# Patient Record
Sex: FEMALE | Race: BLACK OR AFRICAN AMERICAN | NOT HISPANIC OR LATINO | Employment: FULL TIME | URBAN - METROPOLITAN AREA
[De-identification: names, ages, dates, MRNs, and addresses within clinical notes are randomized per-mention and may not be internally consistent; named-entity substitution may affect disease eponyms.]

---

## 2017-08-21 ENCOUNTER — HISTORICAL (OUTPATIENT)
Dept: RADIOLOGY | Facility: HOSPITAL | Age: 37
End: 2017-08-21

## 2017-09-18 ENCOUNTER — HISTORICAL (OUTPATIENT)
Dept: RADIOLOGY | Facility: HOSPITAL | Age: 37
End: 2017-09-18

## 2017-09-20 ENCOUNTER — HISTORICAL (OUTPATIENT)
Dept: RADIOLOGY | Facility: HOSPITAL | Age: 37
End: 2017-09-20

## 2019-01-11 ENCOUNTER — HISTORICAL (OUTPATIENT)
Dept: ADMINISTRATIVE | Facility: HOSPITAL | Age: 39
End: 2019-01-11

## 2019-11-11 LAB — POC BETA-HCG (QUAL): NEGATIVE

## 2022-04-07 ENCOUNTER — HISTORICAL (OUTPATIENT)
Dept: ADMINISTRATIVE | Facility: HOSPITAL | Age: 42
End: 2022-04-07

## 2022-04-24 VITALS
SYSTOLIC BLOOD PRESSURE: 126 MMHG | WEIGHT: 226 LBS | BODY MASS INDEX: 41.59 KG/M2 | DIASTOLIC BLOOD PRESSURE: 80 MMHG | HEIGHT: 62 IN

## 2022-05-01 NOTE — HISTORICAL [facility] CERNER
This is a historical note converted from Wili. Formatting and pictures may have been removed.  Please reference Wili for original formatting and attached multimedia. Chief Complaint  Right shoulder pain  History of Present Illness  37?yo?female?non-smoker?presents to ortho clinic for?routine follow up?for?right?shoulder pain and?reporting symptom resolution since CSI 9/11/18 for right CTS. ? Patient points to ?posterior shoulder.?? Patient had injection 10/10/17 and 9/11/18?states improved pain in shoulder.  Onset:??July 2017 with improvement  Current pain level: 6/10 without pain medication .?Quality described as?throbbing, pinch.?? Taking OTC ibuprofen with some relief.  Modifying Factors: ?Worse with/after activity;Improved with rest;??No stiffness??  Previous treatment:Conservative treatment for at least 3 months with HEP/ medications, steroid injection, RX NSAIDs with some?improvement. Requesting CSI in shoulder today, this is 3rd CSI this year.  Previous injuries:?Denies  Associated Symptoms:?No Crepitus/Grinding; No ?Numbness/ tingling;??No swelling;?No skin changes;?no weakness;?No decrease in ROM  Activity:?Sedentary, full ADLs;?Pain interferes with function/daily activity (mild)  Family History:?Family history of arthritis  Review of Systems  Constitutional:No fever;No chills;No weight loss  CV:No swelling;No edema  GI:No urinary retention;No urinary incontinence  :No fecal incontinence  Skin:No rash;No wound  Neuro:No numbness/tingling;No weakness;No saddle anesthesia  MSK: As above  Psych:No depression;No anxiety  Heme/Lymph:No easy bruising;No easy bleeding;No lymphadenopathy  Immuno:No MRSA history  Physical Exam  Vitals & Measurements  HT:?158?cm? HT:?158?cm? WT:?99.5?kg? WT:?99.5?kg? BMI:?39.86?  MSK: RIGHT SHOULDER  General: No apparent distress;?obese  Inspection:?Normal posture, no guarding/self-imposed immobilization of arm;??No skin abnormalities;??Normal?alignment;??No  swelling;?No?erythema;?No?bruising;?No atrophy?/ deconditioning noted  Palpation:?Tenderness noted at supraspinatus?tendon ; ?Crepitus:?Negative?  ROM: mild pain?associated with upward/lateral movement?  ?????Abduction (180):?150  ?????Adduction (45):?45  ?????Flexion (135):?135  ?????Extension (45):?45  ?????Internal rotation: L spine  ?????External rotation (45): 45  Special Tests:  ?????Winging of Scapula: ?Negative?Bilateral  ?????AC Joint Tenderness:?Negative  ?????Apley scratch test:Positive?  ?????Supraspinatus?stress test:?Positive?  ?????Painful Arc Sign:Negative  ?????Jobes test (empty can test):?Negative?  ?????External rotation test:?Negative  ?????Neer test: ?Negative  ?   MSK: RIGHT HAND/ WRIST  Inspection:?No masses, no deformity, no skin discoloration,? no joint swelling, no contracture, no?presences of cysts/nodules.? No muscle atrophy to the thenar eminence or intrinsic muscles of the first web space.  Palpation:?Non-tender;??No swelling;No bony enlargement  ROM:?  ?????Open/closure of hand:?no abnormalities, movement is full and smooth  Strength:??  ????? strength: normal and equal  Special Tests:  ?????Trigger finger presence: ?Negative  ?????Phalen test: Negative  ?????Tinel test:?Negative?  ?????Geovanny carpal compression: Negative  ?????Finkelstein test:?Negative  ?   Neurovascular:?Intact; 2+?distal pulse, sensation intact to light touch  Neuro/Psych: Awake, Alert, Oriented; Normal mood and affect  Lymphatic: No LAD  Skin and Soft Tissue: No bruising, No ecchymosis; No rash; Skin intact  Assessment/Plan  1.?Rotator cuff syndrome of right shoulder?M75.101  1.? Discussed with patient diagnosis and treatment recommendations.? Handout given.  2.? Imaging:?Radiological studies reviewed,?and independently interpreted by me; Discussed with patient;  3.? Treatment plan: Conservative treatment to include? ?oral glucosamine 1500 mg/day; Topical capsaicin as needed; rest;?Hot or cold therapy;  Adequate vitamin C/D intake  4.? Procedure:?Discussed CSI vs VS injections as treatment options; since conservative measures did not improve symptoms patient consented for CSI today  5.? Activity:?Activity as tolerated; HEP to included aerobic conditioning with non-painful activity and ROM/STG exercises; activity modification as necessary; eliminate overhead reaching and/or forceful push/pull and use weighted pendulum stretching for 5 minutes after?heat application daily; rubber band provided for HEP provided at prior visit  6.? Therapy:Formal PT/OT ordered?  7.? Medication:?First line treatment with? ?daily acetaminophen 1000 mg three times daily ; Medication precautions given; OTC ibuprofen as needed for pain according to label instructions, continue other medications as RX per PCP  8.? RTC:?4 months  9.? Additional work-up:?None  Ordered:  Lidocaine inj., 5 mL, form: Injection, Intra-Articular, Once, first dose 01/11/19 9:46:00 CST, stop date 01/11/19 9:46:00 CST  triamcinolone, 40 mg, form: Injection, Intra-Articular, Once, first dose 01/11/19 9:46:00 CST, stop date 01/11/19 9:46:00 CST  asp/inj jnt/bursa, major 20610 PC, 01/11/19 9:46:00 CST, [facility], Routine, 01/11/19 9:46:00 CST  Clinic Follow up, *Est. 05/11/19 3:00:00 CDT, Order for future visit, Rotator cuff syndrome of right shoulder, [facility]  Office/Outpatient Visit Level 4 Established 13520 , 25, Rotator cuff syndrome of right shoulder, [facility], 01/11/19 9:46:00 CST  ?  2.?Carpal tunnel syndrome, right?G56.01  1.? Discussed with patient diagnosis and treatment recommendations.? Handout given.  2.? Imaging:?EMG studies reviewed and discussed with patient.  3.? Treatment plan: Conservative treatment to include ?oral glucosamine 1500 mg/day; Topical capsaicin as needed; Hot or cold therapy; Adequate vitamin C/D intake;  4.? Procedure:?none  5.? Activity:?Activity as tolerated; activity modifications as necessary; nocturnal?splinting of  right wrist; HEP 1- times per week  6.? Therapy:none  7.? Medication:?same as above  8.? RTC:?PRN  9.? Additional work-up:?None  ?  3.?Morbid obesity?E66.01  ?Patient educated that diet modifications with low impact exercises as tolerated for reduction in BMI would improve overall treatment and outcome.?  ?  Pain?R52  same as above  Ordered:  XR Shoulder Right Minimum 2 Views, Routine, 01/11/19 8:51:00 CST, Pain, None, Ambulatory, Rad Type, Pain, Not Scheduled, 01/11/19 8:51:00 CST  ?   Problem List/Past Medical History  Ongoing  Breast nodule  BV (bacterial vaginosis)  Gynecologic exam normal  Menorrhagia  Morbid obesity  Screen for STD (sexually transmitted disease)  Vaginal discharge  Visit for routine gyn exam  Historical  Menarche  Pregnant  Pregnant  Pregnant  Pregnant  Procedure/Surgical History  sweat gland removed Rt. axilla (2012)   Medications  diclofenac 1% topical gel, 1 fran, TOP, QID, PRN, 3 refills,? ?Not Taking, Completed Rx  GABAPENTIN CAP 100MG, 100 mg= 1 cap(s), Oral, TID,? ?Still taking, not as prescribed: Taking prn  KETOROLAC TAB 10MG,? ?Not Taking per Prescriber  Norco 7.5 mg-325 mg oral tablet, 1 tab(s), Oral, Once, PRN,? 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 Living situation: Home/Independent., 09/11/2017  Other   and Catholic, 09/11/2017  Sexual  Sexually active: Yes., 09/11/2017  Substance Abuse - Denies Substance Abuse, 03/10/2015  Never, 09/11/2017  Tobacco  Never (less than 100 in lifetime), N/A, 01/11/2019  Family History  Breast cancer: Grandmother.  Health Maintenance  Health Maintenance  ???Pending?(in the next year)  ??? ??OverDue  ??? ? ? ?Depression Screening due??10/10/18??and every 1??year(s)  ???  ??Due?  ??? ? ? ?ADL Screening due??01/11/19??and every 1??year(s)  ??? ? ? ?Alcohol Misuse Screening due??01/11/19??and every 1??year(s)  ??? ? ? ?Diabetes Screening due??01/11/19??and every?  ??? ? ? ?Tetanus Vaccine due??01/11/19??and every 10??year(s)  ??? ??Due In Future?  ??? ? ? ?Blood Pressure Screening not due until??10/31/19??and every 1??year(s)  ??? ? ? ?Body Mass Index Check not due until??10/31/19??and every 1??year(s)  ???Satisfied?(in the past 1 year)  ??? ??Satisfied?  ??? ? ? ?Blood Pressure Screening on??10/31/18.  ??? ? ? ?Body Mass Index Check on??01/11/19.??Satisfied by Kelly Boogie RN  ??? ? ? ?Cervical Cancer Screening on??11/01/18.??Satisfied by Jordana Zambrano  ??? ? ? ?Influenza Vaccine on??01/11/19.??Satisfied by Kelly Boogie RN  ??? ? ? ?Obesity Screening on??01/11/19.??Satisfied by Kelly Boogie RN  ?  ?  Diagnostic Results  XR right shoulder obtained 1/11/19; no acute findings; awaiting radiologist findings.

## 2022-05-01 NOTE — HISTORICAL [facility] CERNER
This is a historical note converted from Wili. Formatting and pictures may have been removed.  Please reference Wili for original formatting and attached multimedia. Procedure Name  Date/Time:1/11/2019 09:54:50  Procedure:??Right?SASD Injection  Indications:??Diagnostic and Therapeutic Indication - decrease pain, increase range of motion and improve quality of life  RISKS: Possible complications with injection include?bleeding, infection (0.01%), tendon rupture, steroid flare, fat pad or soft tissue atrophy, skin depigmentation, and vasovagal response. ?(Steroid flair treatment is rest, ice, NSAIDs and resolves in 24-36 hours.)  Consent:???Procedure, risks, benefits, & alternatives explained to patient, who voiced understanding and agreed to proceed with procedure. ?Consent signed and?scanned into the medical record. No absolute contraindications (cellulitis overlying joint, infection, lack of informed consent, allergy to injection mediation, moses protein or egg allergy for sodium hyaluronate, or history of steroid flare) or relative contraindications (brittle or out of control DM HgA1c > 10, coagulopathy INR > 3.5, previous joint replacement, or history of AVN).  Description:?Time out performed. The patient was prepped?using Chlorhexidine scrub after the appropriate?identification of anatomic landmarks.? Sterile needle used (size # 21 gauge, length 1.5 inch)?Topical anesthetic of ethyl chloride was used.? ?5 mL of 1% lidocaine plain with 40 mg of Kenalog injected.  Complications:?None?  EBL:??None  Post Procedure:?Patient reports improvement in pain and ROM. Patient alert, moving all extremities. ?Good peripheral pulses, no signs of vascular compromise, range of motion intact. ?Patient tolerated procedure well. ?Aftercare instructions were given to patient at time of discharge.??Relative rest for 3 days - avoiding excessive activity. ?Pendulum stretching exercises followed by stretching exercises  daily?starting on day 4.? Place ice on area for 15 minutes every 4 to 6 hours.??Tylenol 1000mg twice a day or ibuprofen 600 mg three times per day for next 3-4 days if not on medication already. ?Protect the area for the next 1-8 hours if anesthetic was used. ?Avoid excessive activity for next 3 to 4 weeks.?ER precautions for fever, severe joint pain, or allergic reaction or other new symptoms related to joint injection.

## 2022-09-15 ENCOUNTER — HISTORICAL (OUTPATIENT)
Dept: ADMINISTRATIVE | Facility: HOSPITAL | Age: 42
End: 2022-09-15

## 2023-06-29 ENCOUNTER — ANESTHESIA EVENT (OUTPATIENT)
Dept: SURGERY | Facility: HOSPITAL | Age: 43
End: 2023-06-29
Payer: COMMERCIAL

## 2023-07-03 ENCOUNTER — LAB VISIT (OUTPATIENT)
Dept: LAB | Facility: HOSPITAL | Age: 43
End: 2023-07-03
Attending: OBSTETRICS & GYNECOLOGY
Payer: COMMERCIAL

## 2023-07-03 DIAGNOSIS — D25.1 INTRAMURAL LEIOMYOMA OF UTERUS: ICD-10-CM

## 2023-07-03 DIAGNOSIS — N92.0 EXCESSIVE OR FREQUENT MENSTRUATION: Primary | ICD-10-CM

## 2023-07-03 LAB
ABORH RETYPE: NORMAL
ANION GAP SERPL CALC-SCNC: 5 MEQ/L
B-HCG FREE SERPL-ACNC: <2.42 MIU/ML
BUN SERPL-MCNC: 8.9 MG/DL (ref 7–18.7)
CALCIUM SERPL-MCNC: 8.7 MG/DL (ref 8.4–10.2)
CHLORIDE SERPL-SCNC: 109 MMOL/L (ref 98–107)
CO2 SERPL-SCNC: 24 MMOL/L (ref 22–29)
CREAT SERPL-MCNC: 0.65 MG/DL (ref 0.55–1.02)
CREAT/UREA NIT SERPL: 14
ERYTHROCYTE [DISTWIDTH] IN BLOOD BY AUTOMATED COUNT: 22.5 % (ref 11.5–17)
GFR SERPLBLD CREATININE-BSD FMLA CKD-EPI: >60 MLS/MIN/1.73/M2
GLUCOSE SERPL-MCNC: 109 MG/DL (ref 74–100)
GROUP & RH: NORMAL
HCT VFR BLD AUTO: 29.8 % (ref 37–47)
HGB BLD-MCNC: 9.1 G/DL (ref 12–16)
INDIRECT COOMBS GEL: NORMAL
MCH RBC QN AUTO: 19.1 PG (ref 27–31)
MCHC RBC AUTO-ENTMCNC: 30.5 G/DL (ref 33–36)
MCV RBC AUTO: 62.5 FL (ref 80–94)
NRBC BLD AUTO-RTO: 0 %
PLATELET # BLD AUTO: 413 X10(3)/MCL (ref 130–400)
PMV BLD AUTO: 9.8 FL (ref 7.4–10.4)
POTASSIUM SERPL-SCNC: 3.9 MMOL/L (ref 3.5–5.1)
RBC # BLD AUTO: 4.77 X10(6)/MCL (ref 4.2–5.4)
SODIUM SERPL-SCNC: 138 MMOL/L (ref 136–145)
SPECIMEN OUTDATE: NORMAL
WBC # SPEC AUTO: 6.1 X10(3)/MCL (ref 4.5–11.5)

## 2023-07-03 PROCEDURE — 36415 COLL VENOUS BLD VENIPUNCTURE: CPT

## 2023-07-03 PROCEDURE — 80048 BASIC METABOLIC PNL TOTAL CA: CPT

## 2023-07-03 PROCEDURE — 85027 COMPLETE CBC AUTOMATED: CPT

## 2023-07-03 PROCEDURE — 84702 CHORIONIC GONADOTROPIN TEST: CPT

## 2023-07-03 PROCEDURE — 86900 BLOOD TYPING SEROLOGIC ABO: CPT | Performed by: OBSTETRICS & GYNECOLOGY

## 2023-07-03 NOTE — H&P (VIEW-ONLY)
Chief Complaint:  uterine fibroid, excessive VB with menses, surgical counseling    History of Present Illness    Taty Dunham is a 42 y.o.      Pt with >10 year history of know uterine fundal fibroid and increasingly   heavy cycles and with blood clots.    At this point she is requesting definitive surgical treatment with TLH.  She declines any attempts of medical mgt.     Past Medical History:   Diagnosis Date    Breast nodule     Intramural uterine fibroid     Irregular menstruation     Menorrhagia     Migraine     Morbid obesity        Past Surgical History:   Procedure Laterality Date    AXILLARY HIDRADENITIS EXCISION Right 2012    BREAST BIOPSY Left 2017    TUBAL LIGATION  2006    WISDOM TOOTH EXTRACTION  1999       Family History   Problem Relation Age of Onset    Breast cancer Other        Social History     Tobacco Use   Smoking Status Former    Types: Cigarettes   Smokeless Tobacco Never       Social History     Substance and Sexual Activity   Sexual Activity Yes        OB History    Para Term  AB Living   4 4 4     4   SAB IAB Ectopic Multiple Live Births           4      # Outcome Date GA Lbr Osvaldo/2nd Weight Sex Delivery Anes PTL Lv   4 Term 06 40w0d    Vag-Spont   ALBERTO   3 Term 04 38w0d    Vag-Spont   ALBERTO   2 Term 98 38w0d    Vag-Spont   ALBERTO   1 Term 96 38w0d    Vag-Spont   ALBERTO       Review of Systems  GENERAL: Denies unintentional weight gain or weight loss. Feeling well overall.   SKIN: Denies rash or lesions.   HEENT: Denies headaches, or vision changes.   CARDIOVASCULAR: Denies palpitations or chest pain.   RESPIRATORY: Denies shortness of breath or dyspnea on exertion.  BREASTS: Denies pain, lumps, or nipple discharge.   ABDOMEN: Denies abdominal pain, constipation, diarrhea, nausea, vomiting, change in appetite.  URINARY: Denies frequency, dysuria, hematuria.  NEUROLOGIC: Denies syncope or weakness.   PSYCHIATRIC: Denies depression,  "anxiety or mood swings.     Objective:     BP (!) 146/85   Pulse 91   Ht 5' 2.01" (1.575 m)   Wt 102.4 kg (225 lb 12 oz)   LMP 06/08/2023   BMI 41.28 kg/m²     Body mass index is 41.28 kg/m².    APPEARANCE: Well nourished, well developed, in no acute distress.  PSYCH: Appropriate mood and affect.  SKIN: No acne or hirsutism    NODES: No inguinal, axillary, or supraclavicular lymph node enlargement  CHEST: Normal respiratory effort  CV: Normal cap refill.    ABDOMEN: Soft.  No tenderness or masses.    Uterus: approx 18w  size with large fundal fibroid c/w prior history mobile, non-tender.  EXTREMITIES: No edema           Assessment/ Plan:     1. Menorrhagia with regular cycle        2. Intramural leiomyoma of uterus            1. To OR for Robot-assisted TLH  this week.    2. After detailed review of surgery and potential risks, consent signed.      Counseling     With the patient I had a full discussion of the risks, benefits, and alternatives of all procedures to be performed, including but not limited to injury to other organs, failure to resolve problem, recurrence of disease state, and infection. We discussed the risks, benefits, and alternatives as well.  She agrees with the plan of care; therefore, we will proceed with the surgery as scheduled.     "

## 2023-07-06 ENCOUNTER — HOSPITAL ENCOUNTER (OUTPATIENT)
Facility: HOSPITAL | Age: 43
Discharge: HOME | End: 2023-07-06
Attending: OBSTETRICS & GYNECOLOGY | Admitting: OBSTETRICS & GYNECOLOGY
Payer: COMMERCIAL

## 2023-07-06 ENCOUNTER — ANESTHESIA (OUTPATIENT)
Dept: SURGERY | Facility: HOSPITAL | Age: 43
End: 2023-07-06
Payer: COMMERCIAL

## 2023-07-06 DIAGNOSIS — N92.0 EXCESSIVE OR FREQUENT MENSTRUATION: ICD-10-CM

## 2023-07-06 DIAGNOSIS — N92.0 MENORRHAGIA: ICD-10-CM

## 2023-07-06 DIAGNOSIS — D25.1 FIBROIDS, INTRAMURAL: ICD-10-CM

## 2023-07-06 DIAGNOSIS — N92.0 MENORRHAGIA WITH REGULAR CYCLE: Primary | ICD-10-CM

## 2023-07-06 LAB — B-HCG FREE SERPL-ACNC: <2.42 MIU/ML

## 2023-07-06 PROCEDURE — D9220A PRA ANESTHESIA: ICD-10-PCS | Mod: CRNA,,, | Performed by: STUDENT IN AN ORGANIZED HEALTH CARE EDUCATION/TRAINING PROGRAM

## 2023-07-06 PROCEDURE — 37000009 HC ANESTHESIA EA ADD 15 MINS: Performed by: OBSTETRICS & GYNECOLOGY

## 2023-07-06 PROCEDURE — 63600175 PHARM REV CODE 636 W HCPCS: Performed by: STUDENT IN AN ORGANIZED HEALTH CARE EDUCATION/TRAINING PROGRAM

## 2023-07-06 PROCEDURE — 36000713 HC OR TIME LEV V EA ADD 15 MIN: Performed by: OBSTETRICS & GYNECOLOGY

## 2023-07-06 PROCEDURE — D9220A PRA ANESTHESIA: ICD-10-PCS | Mod: ANES,,, | Performed by: STUDENT IN AN ORGANIZED HEALTH CARE EDUCATION/TRAINING PROGRAM

## 2023-07-06 PROCEDURE — 25000003 PHARM REV CODE 250: Performed by: OBSTETRICS & GYNECOLOGY

## 2023-07-06 PROCEDURE — 37000008 HC ANESTHESIA 1ST 15 MINUTES: Performed by: OBSTETRICS & GYNECOLOGY

## 2023-07-06 PROCEDURE — D9220A PRA ANESTHESIA: Mod: ANES,,, | Performed by: STUDENT IN AN ORGANIZED HEALTH CARE EDUCATION/TRAINING PROGRAM

## 2023-07-06 PROCEDURE — 84702 CHORIONIC GONADOTROPIN TEST: CPT | Performed by: OBSTETRICS & GYNECOLOGY

## 2023-07-06 PROCEDURE — 63600175 PHARM REV CODE 636 W HCPCS: Performed by: OBSTETRICS & GYNECOLOGY

## 2023-07-06 PROCEDURE — 71000016 HC POSTOP RECOV ADDL HR: Performed by: OBSTETRICS & GYNECOLOGY

## 2023-07-06 PROCEDURE — 71000015 HC POSTOP RECOV 1ST HR: Performed by: OBSTETRICS & GYNECOLOGY

## 2023-07-06 PROCEDURE — 25000003 PHARM REV CODE 250: Performed by: STUDENT IN AN ORGANIZED HEALTH CARE EDUCATION/TRAINING PROGRAM

## 2023-07-06 PROCEDURE — D9220A PRA ANESTHESIA: Mod: CRNA,,, | Performed by: STUDENT IN AN ORGANIZED HEALTH CARE EDUCATION/TRAINING PROGRAM

## 2023-07-06 PROCEDURE — 36000712 HC OR TIME LEV V 1ST 15 MIN: Performed by: OBSTETRICS & GYNECOLOGY

## 2023-07-06 PROCEDURE — 27201423 OPTIME MED/SURG SUP & DEVICES STERILE SUPPLY: Performed by: OBSTETRICS & GYNECOLOGY

## 2023-07-06 PROCEDURE — 71000033 HC RECOVERY, INTIAL HOUR: Performed by: OBSTETRICS & GYNECOLOGY

## 2023-07-06 RX ORDER — FENTANYL CITRATE 50 UG/ML
INJECTION, SOLUTION INTRAMUSCULAR; INTRAVENOUS
Status: DISCONTINUED | OUTPATIENT
Start: 2023-07-06 | End: 2023-07-06

## 2023-07-06 RX ORDER — HYDROMORPHONE HYDROCHLORIDE 2 MG/ML
1 INJECTION, SOLUTION INTRAMUSCULAR; INTRAVENOUS; SUBCUTANEOUS EVERY 4 HOURS PRN
Status: DISCONTINUED | OUTPATIENT
Start: 2023-07-06 | End: 2023-07-06 | Stop reason: HOSPADM

## 2023-07-06 RX ORDER — DEXAMETHASONE SODIUM PHOSPHATE 4 MG/ML
INJECTION, SOLUTION INTRA-ARTICULAR; INTRALESIONAL; INTRAMUSCULAR; INTRAVENOUS; SOFT TISSUE
Status: DISCONTINUED | OUTPATIENT
Start: 2023-07-06 | End: 2023-07-06

## 2023-07-06 RX ORDER — METOCLOPRAMIDE HYDROCHLORIDE 5 MG/ML
10 INJECTION INTRAMUSCULAR; INTRAVENOUS EVERY 10 MIN PRN
Status: DISCONTINUED | OUTPATIENT
Start: 2023-07-06 | End: 2023-07-06 | Stop reason: HOSPADM

## 2023-07-06 RX ORDER — PROPOFOL 10 MG/ML
VIAL (ML) INTRAVENOUS
Status: DISCONTINUED | OUTPATIENT
Start: 2023-07-06 | End: 2023-07-06

## 2023-07-06 RX ORDER — ACETAMINOPHEN 10 MG/ML
INJECTION, SOLUTION INTRAVENOUS
Status: DISCONTINUED | OUTPATIENT
Start: 2023-07-06 | End: 2023-07-06

## 2023-07-06 RX ORDER — CEFAZOLIN SODIUM 2 G/50ML
2 SOLUTION INTRAVENOUS
Status: DISCONTINUED | OUTPATIENT
Start: 2023-07-06 | End: 2023-07-06 | Stop reason: HOSPADM

## 2023-07-06 RX ORDER — HYDROMORPHONE HYDROCHLORIDE 2 MG/ML
0.4 INJECTION, SOLUTION INTRAMUSCULAR; INTRAVENOUS; SUBCUTANEOUS EVERY 10 MIN PRN
Status: DISCONTINUED | OUTPATIENT
Start: 2023-07-06 | End: 2023-07-06

## 2023-07-06 RX ORDER — OXYCODONE AND ACETAMINOPHEN 5; 325 MG/1; MG/1
1 TABLET ORAL EVERY 4 HOURS PRN
Qty: 28 TABLET | Refills: 0 | Status: SHIPPED | OUTPATIENT
Start: 2023-07-06

## 2023-07-06 RX ORDER — BUPIVACAINE HYDROCHLORIDE 2.5 MG/ML
INJECTION, SOLUTION EPIDURAL; INFILTRATION; INTRACAUDAL
Status: DISCONTINUED | OUTPATIENT
Start: 2023-07-06 | End: 2023-07-06 | Stop reason: HOSPADM

## 2023-07-06 RX ORDER — ONDANSETRON 2 MG/ML
INJECTION INTRAMUSCULAR; INTRAVENOUS
Status: DISCONTINUED | OUTPATIENT
Start: 2023-07-06 | End: 2023-07-06

## 2023-07-06 RX ORDER — MIDAZOLAM HYDROCHLORIDE 1 MG/ML
INJECTION INTRAMUSCULAR; INTRAVENOUS
Status: DISCONTINUED | OUTPATIENT
Start: 2023-07-06 | End: 2023-07-06

## 2023-07-06 RX ORDER — ROCURONIUM BROMIDE 10 MG/ML
INJECTION, SOLUTION INTRAVENOUS
Status: DISCONTINUED | OUTPATIENT
Start: 2023-07-06 | End: 2023-07-06

## 2023-07-06 RX ORDER — SODIUM CHLORIDE 9 MG/ML
INJECTION, SOLUTION INTRAVENOUS CONTINUOUS
Status: DISCONTINUED | OUTPATIENT
Start: 2023-07-06 | End: 2023-07-06 | Stop reason: HOSPADM

## 2023-07-06 RX ORDER — DIPHENHYDRAMINE HYDROCHLORIDE 50 MG/ML
25 INJECTION INTRAMUSCULAR; INTRAVENOUS EVERY 6 HOURS PRN
Status: DISCONTINUED | OUTPATIENT
Start: 2023-07-06 | End: 2023-07-06 | Stop reason: HOSPADM

## 2023-07-06 RX ORDER — ONDANSETRON 4 MG/1
8 TABLET, ORALLY DISINTEGRATING ORAL EVERY 8 HOURS PRN
Status: DISCONTINUED | OUTPATIENT
Start: 2023-07-06 | End: 2023-07-06 | Stop reason: HOSPADM

## 2023-07-06 RX ORDER — MEPERIDINE HYDROCHLORIDE 25 MG/ML
12.5 INJECTION INTRAMUSCULAR; INTRAVENOUS; SUBCUTANEOUS EVERY 10 MIN PRN
Status: DISCONTINUED | OUTPATIENT
Start: 2023-07-06 | End: 2023-07-06 | Stop reason: HOSPADM

## 2023-07-06 RX ORDER — IBUPROFEN 600 MG/1
600 TABLET ORAL EVERY 6 HOURS PRN
Status: DISCONTINUED | OUTPATIENT
Start: 2023-07-06 | End: 2023-07-06 | Stop reason: HOSPADM

## 2023-07-06 RX ORDER — HYDROMORPHONE HYDROCHLORIDE 2 MG/ML
0.2 INJECTION, SOLUTION INTRAMUSCULAR; INTRAVENOUS; SUBCUTANEOUS EVERY 5 MIN PRN
Status: DISCONTINUED | OUTPATIENT
Start: 2023-07-06 | End: 2023-07-06 | Stop reason: HOSPADM

## 2023-07-06 RX ORDER — KETOROLAC TROMETHAMINE 30 MG/ML
INJECTION, SOLUTION INTRAMUSCULAR; INTRAVENOUS
Status: DISCONTINUED | OUTPATIENT
Start: 2023-07-06 | End: 2023-07-06

## 2023-07-06 RX ORDER — EPINEPHRINE 0.1 MG/ML
INJECTION INTRAVENOUS
Status: DISCONTINUED | OUTPATIENT
Start: 2023-07-06 | End: 2023-07-06 | Stop reason: HOSPADM

## 2023-07-06 RX ORDER — FAMOTIDINE 20 MG/1
20 TABLET, FILM COATED ORAL
Status: COMPLETED | OUTPATIENT
Start: 2023-07-06 | End: 2023-07-06

## 2023-07-06 RX ORDER — SODIUM CHLORIDE 0.9 % (FLUSH) 0.9 %
10 SYRINGE (ML) INJECTION
Status: DISCONTINUED | OUTPATIENT
Start: 2023-07-06 | End: 2023-07-06 | Stop reason: HOSPADM

## 2023-07-06 RX ORDER — HYDROCODONE BITARTRATE AND ACETAMINOPHEN 5; 325 MG/1; MG/1
1 TABLET ORAL EVERY 4 HOURS PRN
Status: DISCONTINUED | OUTPATIENT
Start: 2023-07-06 | End: 2023-07-06 | Stop reason: HOSPADM

## 2023-07-06 RX ORDER — SCOLOPAMINE TRANSDERMAL SYSTEM 1 MG/1
1 PATCH, EXTENDED RELEASE TRANSDERMAL
Status: DISCONTINUED | OUTPATIENT
Start: 2023-07-06 | End: 2023-07-06 | Stop reason: HOSPADM

## 2023-07-06 RX ORDER — PROCHLORPERAZINE EDISYLATE 5 MG/ML
5 INJECTION INTRAMUSCULAR; INTRAVENOUS EVERY 30 MIN PRN
Status: DISCONTINUED | OUTPATIENT
Start: 2023-07-06 | End: 2023-07-06 | Stop reason: HOSPADM

## 2023-07-06 RX ORDER — PROCHLORPERAZINE EDISYLATE 5 MG/ML
5 INJECTION INTRAMUSCULAR; INTRAVENOUS EVERY 6 HOURS PRN
Status: DISCONTINUED | OUTPATIENT
Start: 2023-07-06 | End: 2023-07-06 | Stop reason: HOSPADM

## 2023-07-06 RX ORDER — LIDOCAINE HYDROCHLORIDE 20 MG/ML
INJECTION INTRAVENOUS
Status: DISCONTINUED | OUTPATIENT
Start: 2023-07-06 | End: 2023-07-06

## 2023-07-06 RX ADMIN — PROPOFOL 200 MG: 10 INJECTION, EMULSION INTRAVENOUS at 07:07

## 2023-07-06 RX ADMIN — ROCURONIUM BROMIDE 20 MG: 10 SOLUTION INTRAVENOUS at 08:07

## 2023-07-06 RX ADMIN — SCOPOLAMINE 1 PATCH: 1 PATCH TRANSDERMAL at 06:07

## 2023-07-06 RX ADMIN — LIDOCAINE HYDROCHLORIDE 100 MG: 20 INJECTION INTRAVENOUS at 07:07

## 2023-07-06 RX ADMIN — HYDROCODONE BITARTRATE AND ACETAMINOPHEN 1 TABLET: 5; 325 TABLET ORAL at 10:07

## 2023-07-06 RX ADMIN — ROCURONIUM BROMIDE 50 MG: 10 SOLUTION INTRAVENOUS at 07:07

## 2023-07-06 RX ADMIN — HYDROMORPHONE HYDROCHLORIDE 0.2 MG: 2 INJECTION INTRAMUSCULAR; INTRAVENOUS; SUBCUTANEOUS at 09:07

## 2023-07-06 RX ADMIN — DEXAMETHASONE SODIUM PHOSPHATE 8 MG: 4 INJECTION, SOLUTION INTRA-ARTICULAR; INTRALESIONAL; INTRAMUSCULAR; INTRAVENOUS; SOFT TISSUE at 07:07

## 2023-07-06 RX ADMIN — DEXTROSE MONOHYDRATE 2 G: 50 INJECTION, SOLUTION INTRAVENOUS at 07:07

## 2023-07-06 RX ADMIN — SODIUM CHLORIDE, SODIUM GLUCONATE, SODIUM ACETATE, POTASSIUM CHLORIDE AND MAGNESIUM CHLORIDE: 526; 502; 368; 37; 30 INJECTION, SOLUTION INTRAVENOUS at 07:07

## 2023-07-06 RX ADMIN — KETOROLAC TROMETHAMINE 30 MG: 30 INJECTION, SOLUTION INTRAMUSCULAR; INTRAVENOUS at 09:07

## 2023-07-06 RX ADMIN — IBUPROFEN 600 MG: 600 TABLET, FILM COATED ORAL at 10:07

## 2023-07-06 RX ADMIN — ONDANSETRON 4 MG: 2 INJECTION INTRAMUSCULAR; INTRAVENOUS at 09:07

## 2023-07-06 RX ADMIN — ACETAMINOPHEN 1000 MG: 10 INJECTION, SOLUTION INTRAVENOUS at 08:07

## 2023-07-06 RX ADMIN — MIDAZOLAM HYDROCHLORIDE 2 MG: 1 INJECTION, SOLUTION INTRAMUSCULAR; INTRAVENOUS at 07:07

## 2023-07-06 RX ADMIN — FENTANYL CITRATE 100 MCG: 50 INJECTION, SOLUTION INTRAMUSCULAR; INTRAVENOUS at 07:07

## 2023-07-06 RX ADMIN — SUGAMMADEX 200 MG: 100 INJECTION, SOLUTION INTRAVENOUS at 09:07

## 2023-07-06 RX ADMIN — ROCURONIUM BROMIDE 20 MG: 10 SOLUTION INTRAVENOUS at 07:07

## 2023-07-06 RX ADMIN — FAMOTIDINE 20 MG: 20 TABLET, FILM COATED ORAL at 06:07

## 2023-07-06 NOTE — ANESTHESIA PREPROCEDURE EVALUATION
07/06/2023  Taty Gibson is a 42 y.o., female.    Other Medical History   Menorrhagia Intramural uterine fibroid   Morbid obesity Breast nodule   Migraine Irregular menstruation     Surgical History  BREAST BIOPSY AXILLARY HIDRADENITIS EXCISION   TUBAL LIGATION WISDOM TOOTH EXTRACTION     Asa 3 for morbid obesity with bmi > 40  Na 138, K 3.9, Cr 0.65  9.1 / 30 / 413k // type and screen active    Pre-op Assessment    I have reviewed the Patient Summary Reports.     I have reviewed the Nursing Notes. I have reviewed the NPO Status.   I have reviewed the Medications.     Review of Systems  Anesthesia Hx:   Denies Personal Hx of Anesthesia complications.   Social:  Former Smoker    Cardiovascular:   Exercise tolerance: good    Pulmonary:  Pulmonary Normal    Hepatic/GI:  Hepatic/GI Normal    Neurological:   Headaches (migraine)    Endocrine:  Morbid Obesity / BMI > 40  Psych:  Psychiatric Normal           Physical Exam  General: Well nourished, Cooperative and Alert    Airway:  Mallampati: I   Mouth Opening: Normal  TM Distance: Normal  Tongue: Large  Neck ROM: Normal ROM    Dental:  Intact    Chest/Lungs:  Normal Respiratory Rate    Heart:  Rate: Normal        Anesthesia Plan  Type of Anesthesia, risks & benefits discussed:    Anesthesia Type: Gen ETT  Intra-op Monitoring Plan: Standard ASA Monitors  Post Op Pain Control Plan: multimodal analgesia  Induction:  IV  Informed Consent: Informed consent signed with the Patient and all parties understand the risks and agree with anesthesia plan.  All questions answered.   ASA Score: 3  Day of Surgery Review of History & Physical: H&P Update referred to the surgeon/provider.    Ready For Surgery From Anesthesia Perspective.     .

## 2023-07-06 NOTE — ANESTHESIA PROCEDURE NOTES
Intubation    Date/Time: 7/6/2023 7:26 AM  Performed by: Abimbola Schmidt CRNA  Authorized by: Kendell Araujo MD     Intubation:     Induction:  Intravenous    Intubated:  Postinduction    Mask Ventilation:  Easy mask    Attempts:  1    Attempted By:  Student and CRNA    Method of Intubation:  Direct    Blade:  Carmita 3    Laryngeal View Grade: Grade IIb - only the arytenoids and epiglottis seen      Difficult Airway Encountered?: No      Complications:  None    Airway Device:  Oral endotracheal tube    Airway Device Size:  7.0    Style/Cuff Inflation:  Cuffed    Inflation Amount (mL):  6    Tube secured:  19    Secured at:  The lips    Placement Verified By:  Capnometry    Complicating Factors:  None    Findings Post-Intubation:  BS equal bilateral and atraumatic/condition of teeth unchanged

## 2023-07-06 NOTE — OP NOTE
[facility] - Periop Services  OBGYN  Operative Note    SUMMARY     Date of Procedure: 7/6/2023     Procedure: Procedure(s) (LRB):  XI ROBOTIC HYSTERECTOMY (N/A)       Surgeon: Tim Lerma M.D.    Assistant:     Assisting Surgeon: None    Pre-Operative Diagnosis: Fibroids, intramural [D25.1]  Excessive or frequent menstruation [N92.0]    Post-Operative Diagnosis: same as above    Anesthesia: General    Complications: No    Estimated Blood Loss (EBL):  <100ml    Findings:     1. Approx 15week size uterus   2. bilateral tube s/p BTL and normal ovaries, though right ovary with adhesions to underlying ovarian fossa        Procedure in Detail:  Ms. Gibson was taken to the operating room, and a time out was performed. General anesthesia was performed. The patient was then prepped and draped in the normal sterile fashion. She was placed in the dorsal lithotomy position in Alejandro stirrups. Her arms were tucked in the usual fashion. A ledbetter was placed to gravity.The cervix was grasped with a single tooth tenaculum and th uterus was sounded to 10 cm. The 10 cm DANE tip and medium Koh cup were then assembled and placed in and around the cervix.       Attention was then turned towards the abdomen where the base of the umbilicus was anesthetized with half percent Marcaine with epinephrine.   A 1 cm vertical skin incision was made with scalpel at the base of the umbilicus. Veress needle was placed in the intraperitoneal cavity. Intraperitoneal placement confirmed by instillation of normal saline and ball test.  The pneumoperitoneum was then obtained using CO2 with opening pressure noted to be less than 10 mmHg. Pressure was set at 15 mm of mercury.  An 8.5mm camera trochar was placed through the umbilical incision and peritoneal placement was immediately verified laparoscopically.    2 accessory trocars are placed in the right lower quadrant through an anesthetized 8 mm skin incisions under direct laparoscopic  visualization.  The lateral-most trochar was an 8mm  AirSeal trochar for insuflation.  An additional accessory trochar was placed in the left lower quadrant in similar fashion.    Patient's placed in steep Trendelenburg position after which the bowel was swept out of the posterior cul-de-sac. Next the Trendelenburg is reduced approximately 20° for the remainder of the case.   At this time the robot was side-docked with the 8.5mm camera placed through the umbilical trocar.  The first right lower quadrant trocar was initially assembled with the vessel sealer instrument. The left sided trochar was assembled with the [place] bipolar device.  The surgeon broke scrub and was then seated at the console. Diagnostic laparoscopy revealed findings as described above.      The hysterectomy was then initiated in the right side where the tube was mobilized and transected from the mesenteric attachments using the vessel sealer device. Next the right round ligament was cauterized  and transected using the vessel sealer. The right utero-ovarian ligament was cauterized and then transected.  The right broad ligament was opened anteriorly and posteriorly with the vessel sealer. The bladder flap was then created anteriorly using the monopolar scissors with a small amount of cautery. The right uterine artery and veins were skeletonized, cauterized, and then transected.   Procedures were repeated on the left side where the left tube was mobilized while preserving the left ovary, followed by transection of the left round ligament and the left  utero-ovarian ligament. In similar fashion the left uterine vessels were skeletonized cauterized and transected.  The bladder adhesions were carefully dissected of the lower uterine segment  scare with the hot anthony.    Colpotomy was then made anteriorly against the Koh ring using monopolar scissors. The incision was carried around circumferentially until the uterus and cervix were  completely amputated from the vaginal attachments. The uterus was then withdrawn through the vaginal cavity. Vaginal cuff was then closed with 2-0 Stratafix in a running fashion.  After hemostasis was assured, the robot was then undocked.    Next Hemoblast was applied laparoscopically. At this time the pelvis was again irrigated and approximately 700 mL of warm lactated Ringer's was left in the pelvic cavity at the close of the case both minimize postoperative pain as well as postoperative adhesions.   Pneumoperitoneum is then evacuated and all remaining trocars were removed from the abdomen. All 4 trocar sites were closed with 4-0 Monocryl in subcuticular fashion and then sealed with Dermabond skin sealant.     The patient tolerated the procedure well and was taken to the PACU awake and in stable condition.    Specimens:    1. Uterus, cervix, bilateral tubes           Disposition: stable, to home after meeting discharge criteria today.

## 2023-07-06 NOTE — TRANSFER OF CARE
"Anesthesia Transfer of Care Note    Patient: Taty Gibson    Procedure(s) Performed: Procedure(s) (LRB):  XI ROBOTIC HYSTERECTOMY (N/A)    Patient location: PACU    Anesthesia Type: general    Transport from OR: Transported from OR on room air with adequate spontaneous ventilation    Post pain: adequate analgesia    Post assessment: no apparent anesthetic complications    Post vital signs: stable    Level of consciousness: sedated    Nausea/Vomiting: no nausea/vomiting    Complications: none    Transfer of care protocol was followed      Last vitals:   Visit Vitals  /72   Pulse 74   Temp 36.4 °C (97.5 °F) (Temporal)   Resp 11   Ht 5' 2" (1.575 m)   Wt 98.9 kg (218 lb)   LMP 06/08/2023   SpO2 100%   Breastfeeding No   BMI 39.87 kg/m²     "

## 2023-07-06 NOTE — DISCHARGE INSTRUCTIONS
-NO driving for 24 hours and while taking narcotic pain medication.    -Keep sites clean and dry for 48 hours. OK to shower afterwards. Do not tub bathe or submerge incision under water.    -Pelvic rest: no heavy lifting, no tampons, no intercourse for 8 weeks or until cleared by MD.    -You did receive suggammadex, please see attached document concerning birthcontrol and suggammadex.    -Monitor sites for infection: redness, swelling, fever, chills, drainage/pus/foul odor.    -Minimal bleeding is expected. Notify your provider if soaking through pad < 1hour.    -Report to your nearest ER/Notify provider if you experience any SUDDEN/SEVERE chest pain, abdominal pain, weakness, trouble breathing, uncontrolled pain/bleeding.

## 2023-07-07 ENCOUNTER — PATIENT MESSAGE (OUTPATIENT)
Dept: ADMINISTRATIVE | Facility: OTHER | Age: 43
End: 2023-07-07
Payer: MEDICAID

## 2023-07-07 VITALS
OXYGEN SATURATION: 99 % | SYSTOLIC BLOOD PRESSURE: 135 MMHG | DIASTOLIC BLOOD PRESSURE: 83 MMHG | RESPIRATION RATE: 20 BRPM | WEIGHT: 218 LBS | HEART RATE: 62 BPM | TEMPERATURE: 98 F | BODY MASS INDEX: 40.12 KG/M2 | HEIGHT: 62 IN

## 2023-07-07 NOTE — ANESTHESIA POSTPROCEDURE EVALUATION
Anesthesia Post Evaluation    Patient: Taty Gibson    Procedure(s) Performed: Procedure(s) (LRB):  XI ROBOTIC HYSTERECTOMY (N/A)    Final Anesthesia Type: general      Patient location during evaluation: PACU  Patient participation: Yes- Able to Participate  Level of consciousness: awake and alert  Post-procedure vital signs: reviewed and stable  Pain management: adequate  Airway patency: patent    PONV status at discharge: No PONV  Anesthetic complications: no      Respiratory status: unassisted  Hydration status: euvolemic  Follow-up not needed.          Vitals Value Taken Time   /83 07/06/23 1120   Temp 36.6 °C (97.8 °F) 07/06/23 1007   Pulse 62 07/06/23 1120   Resp 20 07/06/23 1017   SpO2 99 % 07/06/23 1120         Event Time   Out of Recovery 10:00:00         Pain/Toby Score: Pain Rating Prior to Med Admin: 10 (7/6/2023 10:17 AM)  Toby Score: 10 (7/6/2023  2:00 PM)  Modified Toby Score: 20 (7/6/2023  2:00 PM)

## 2023-07-11 LAB — PSYCHE PATHOLOGY RESULT: NORMAL

## 2023-07-17 NOTE — DISCHARGE SUMMARY
[facility]  Discharge Summary  OBGYN    Admission date: 7/6/2023  Discharge date: 7/6/2023    Admit Dx:    There is no problem list on file for this patient.    Discharge Dx:  There is no problem list on file for this patient.      Attending Physician: Tim Lerma   Discharge Provider: Tim Lerma    Procedures Performed: Procedure(s) (LRB):  XI ROBOTIC HYSTERECTOMY (N/A)    Hospital Course: Patient was admitted on 7/6/2023 .  Procedure was uncomplicated, for full details see operative report. Barring any unforseen incidents, patient will be discharged home when she is able to ambulate, void, and tolerate PO intake.    Consults: none      Discharged Condition: stable    Disposition: Home    Follow Up:   2 weeks in office.

## (undated) DEVICE — PORT ACCESS 8MM W/120MM LOW

## (undated) DEVICE — TRAY CATH FOL SIL URIMTR 16FR

## (undated) DEVICE — PAD SANITARY HVY ABSRB UNSCNTD

## (undated) DEVICE — BELLOW CANN HEMOBLAST 1.65GR

## (undated) DEVICE — SET TRI-LUMEN FILTERED TUBE

## (undated) DEVICE — SEAL UNIVERSAL 5MM-8MM XI

## (undated) DEVICE — Device

## (undated) DEVICE — KIT SURGICAL TURNOVER

## (undated) DEVICE — ELECTRODE PATIENT RETURN DISP

## (undated) DEVICE — SUT MCRYL PLUS 4-0 PS2 27IN

## (undated) DEVICE — PAD PINK TRENDELENBURG POS XL

## (undated) DEVICE — SPONGE LAP STRL 18X18IN

## (undated) DEVICE — ADHESIVE DERMABOND ADVANCED

## (undated) DEVICE — APPLICATOR HEMOBLAST LAPSCP

## (undated) DEVICE — COVER MAYO STAND REINFRCD 30

## (undated) DEVICE — SEALER VESSEL EXTEND

## (undated) DEVICE — SOL LR IRR 1000ML PB

## (undated) DEVICE — GLOVE PROTEXIS BLUE LATEX 8

## (undated) DEVICE — LEGGING SURG CONV 43X28IN

## (undated) DEVICE — SYR 10CC LUER LOCK

## (undated) DEVICE — NDL HYPO REG 25G X 1 1/2

## (undated) DEVICE — MARKER WRITESITE SKIN CHLRAPRP

## (undated) DEVICE — GLOVE PROTEXIS LTX MICRO  7.5

## (undated) DEVICE — COVER TIP CURVED SCISSORS XI

## (undated) DEVICE — NDL INSUF ULTRA VERESS 120MM

## (undated) DEVICE — TIP RUMI KOH-EFFICIENT 3.5

## (undated) DEVICE — SUT 2-0 CT-1 SPIRAL 12IN

## (undated) DEVICE — IRRIGATOR SUCTION W/TIP

## (undated) DEVICE — SOL CLEARIFY VISUALIZATION LAP

## (undated) DEVICE — COVER TABLE HVY DTY 60X90IN

## (undated) DEVICE — PACK GYN LAPAROSCOPY HZD

## (undated) DEVICE — TIP RUMI GREEN DISPOSABLE6.7MM

## (undated) DEVICE — DRAPE ARM DAVINCI XI

## (undated) DEVICE — OBTURATOR BLADELESS 8MM XI CLR

## (undated) DEVICE — DRAPE COLUMN DAVINCI XI